# Patient Record
Sex: MALE | Race: OTHER | Employment: UNEMPLOYED | URBAN - METROPOLITAN AREA
[De-identification: names, ages, dates, MRNs, and addresses within clinical notes are randomized per-mention and may not be internally consistent; named-entity substitution may affect disease eponyms.]

---
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
## 2022-09-14 ENCOUNTER — VIRTUAL VISIT (OUTPATIENT)
Dept: FAMILY MEDICINE CLINIC | Age: 22
End: 2022-09-14
Payer: MEDICAID

## 2022-09-14 DIAGNOSIS — Z11.4 SCREENING FOR HIV (HUMAN IMMUNODEFICIENCY VIRUS): ICD-10-CM

## 2022-09-14 DIAGNOSIS — R23.8 ERYTHEMATOUS PAPULES OF SKIN: Primary | ICD-10-CM

## 2022-09-14 DIAGNOSIS — Z11.3 SCREENING FOR STD (SEXUALLY TRANSMITTED DISEASE): ICD-10-CM

## 2022-09-14 DIAGNOSIS — Z11.59 ENCOUNTER FOR HEPATITIS C SCREENING TEST FOR LOW RISK PATIENT: ICD-10-CM

## 2022-09-14 PROCEDURE — 99213 OFFICE O/P EST LOW 20 MIN: CPT | Performed by: STUDENT IN AN ORGANIZED HEALTH CARE EDUCATION/TRAINING PROGRAM

## 2022-09-14 NOTE — ASSESSMENT & PLAN NOTE
Chronic. Present for 3 months.  Ddx is broad includes STIs  - STI testing   - Scheduled in person visit for physical exam and follow up STI results

## 2022-09-14 NOTE — PROGRESS NOTES
Bria   23 y.o. male  2000  [address] [place]  [id number]   [address]:    Telemedicine Progress Note  Janice Molina MD       Encounter Date and Time: September 16, 2022 at 11:17 AM    Consent:  He and/or the health care decision maker is aware that that he may receive a bill for this telephone service, depending on his insurance coverage, and has provided verbal consent to proceed: Yes    Chief Complaint   Patient presents with    Skin Problem       History of Present Illness   Adolm Home STEVEN Ornelas is a 23 y.o. male was evaluated by synchronous (real-time) audio-video technology from home, through the [facility] Portal.    Skin rash  - Started 3 mo ago  - Not improving or worsening. Staying same  - Currently present on arms, genitals, back, stomach. No penile lesions. No lesions on hands  - Itchy  - Red bumps  - No h/o skin issues  - No fever, fatigue or malaise  - No contacts with skin issues      Review of Systems   Review of Systems   Constitutional:  Negative for fever and malaise/fatigue. Musculoskeletal:  Negative for joint pain. Skin:  Positive for itching and rash. Vitals/Objective:     General: alert, cooperative, no distress   Mental  status: mental status: alert, oriented to person, place, and time, normal mood, behavior, speech, dress, motor activity, and thought processes   Resp: resp: normal effort and no respiratory distress   Neuro: neuro: no gross deficits   Skin: skin: +erythematous papule noted on trunk   Due to this being a TeleHealth evaluation, many elements of the physical examination are unable to be assessed. Assessment and Plan:   Diagnoses and all orders for this visit:    1. Erythematous papules of skin  Assessment & Plan:  Chronic. Present for 3 months. Ddx is broad includes STIs  - STI testing   - Scheduled in person visit for physical exam and follow up STI results        2.  Screening for HIV (human immunodeficiency virus)  -     HIV 1/2 AG/AB, 4TH GENERATION,W RFLX CONFIRM; Future    3. Encounter for hepatitis C screening test for low risk patient  -     HEPATITIS C AB; Future    4. Screening for STD (sexually transmitted disease)  Assessment & Plan:  Screen for STIs given rash    Orders:  -     Johann Martinez / Agustín Chand; Future  -     RPR; Future        Time spent in direct conversation with the patient to include medical condition(s) discussed, assessment and treatment plan:       We discussed the expected course, resolution and complications of the diagnosis(es) in detail. Medication risks, benefits, costs, interactions, and alternatives were discussed as indicated. I advised him to contact the office if his condition worsens, changes or fails to improve as anticipated. He expressed understanding with the diagnosis(es) and plan. Patient understands that this encounter was a temporary measure, and the importance of further follow up and examination was emphasized. Patient verbalized understanding. Patient informed to follow up: Tue Sept 20th at [facility] for lab work then Sept 21st 6pm for physical exam.    Electronically Signed: Serg Sood MD    Providers location when delivering service (clinic, hospital, home): home    CPT Codes 36646-96519 for Established Patients may apply to this Telehealth Visit. POS code: 18. Modifier GT      Pursuant to the emergency declaration under the [facility] waiver authority and the [employer] Act, this Virtual  Visit was conducted, with patient's consent, to reduce the patient's risk of exposure to COVID-19 and provide continuity of care for an established patient. Services were provided through a video synchronous discussion virtually to substitute for in-person clinic visit.    History   Patients past medical, surgical and family histories were reviewed and updated. Past Medical History:   Diagnosis Date    PPD screening test neg  11.22.2015     No past surgical history on file. No family history on file. Social History     Socioeconomic History    Marital status: SINGLE     Spouse name: Not on file    Number of children: Not on file    Years of education: Not on file    Highest education level: Not on file   Occupational History    Not on file   Tobacco Use    Smoking status: Never    Smokeless tobacco: Never   Substance and Sexual Activity    Alcohol use: No    Drug use: No    Sexual activity: Never   Other Topics Concern    Not on file   Social History Narrative    Not on file     Social Determinants of Health     Financial Resource Strain: Not on file   Food Insecurity: Not on file   Transportation Needs: Not on file   Physical Activity: Not on file   Stress: Not on file   Social Connections: Not on file   Intimate Partner Violence: Not on file   Housing Stability: Not on file     Patient Active Problem List   Diagnosis Code    Erythematous papules of skin R23.8    Screening for STD (sexually transmitted disease) Z11.3            Current Medications/Allergies   Medications and Allergies reviewed:    Current Outpatient Medications   Medication Sig Dispense Refill    calcium carbonate (TUMS) 200 mg calcium (500 mg) chew Take 1 Tab by mouth daily.  30 Tab 0     No Known Allergies

## 2022-09-16 PROBLEM — Z11.3 SCREENING FOR STD (SEXUALLY TRANSMITTED DISEASE): Status: ACTIVE | Noted: 2022-09-16

## 2022-09-20 ENCOUNTER — LAB ONLY (OUTPATIENT)
Dept: FAMILY MEDICINE CLINIC | Age: 22
End: 2022-09-20

## 2022-09-20 DIAGNOSIS — Z11.59 ENCOUNTER FOR HEPATITIS C SCREENING TEST FOR LOW RISK PATIENT: ICD-10-CM

## 2022-09-20 DIAGNOSIS — Z11.3 SCREENING FOR STD (SEXUALLY TRANSMITTED DISEASE): ICD-10-CM

## 2022-09-20 DIAGNOSIS — Z11.4 SCREENING FOR HIV (HUMAN IMMUNODEFICIENCY VIRUS): ICD-10-CM

## 2022-09-21 ENCOUNTER — OFFICE VISIT (OUTPATIENT)
Dept: FAMILY MEDICINE CLINIC | Age: 22
End: 2022-09-21
Payer: MEDICAID

## 2022-09-21 VITALS
TEMPERATURE: 98.4 F | OXYGEN SATURATION: 98 % | RESPIRATION RATE: 16 BRPM | BODY MASS INDEX: 22.54 KG/M2 | WEIGHT: 143.6 LBS | HEART RATE: 60 BPM | SYSTOLIC BLOOD PRESSURE: 110 MMHG | HEIGHT: 67 IN | DIASTOLIC BLOOD PRESSURE: 63 MMHG

## 2022-09-21 DIAGNOSIS — R74.8 ELEVATED ALKALINE PHOSPHATASE LEVEL: ICD-10-CM

## 2022-09-21 DIAGNOSIS — R23.8 ERYTHEMATOUS PAPULES OF SKIN: Primary | ICD-10-CM

## 2022-09-21 LAB
HCV AB SERPL QL IA: NONREACTIVE
HIV 1+2 AB+HIV1 P24 AG SERPL QL IA: NONREACTIVE
HIV12 RESULT COMMENT, HHIVC: NORMAL
RPR SER QL: NONREACTIVE

## 2022-09-21 PROCEDURE — 99213 OFFICE O/P EST LOW 20 MIN: CPT | Performed by: STUDENT IN AN ORGANIZED HEALTH CARE EDUCATION/TRAINING PROGRAM

## 2022-09-21 NOTE — PROGRESS NOTES
Identified pt with two pt identifiers(name and ). Reviewed record in preparation for visit and have obtained necessary documentation. Chief Complaint   Patient presents with    Rash     Private parts, abdominal, patient states it itches, x 3 months        Health Maintenance Due   Topic    Depression Screen     COVID-19 Vaccine (1)    Flu Vaccine (1)       Visit Vitals  /63 (BP 1 Location: Right upper arm, BP Patient Position: Sitting, BP Cuff Size: Adult)   Pulse 60   Temp 98.4 °F (36.9 °C) (Oral)   Resp 16   Ht 5' 7\" (1.702 m)   Wt 143 lb 9.6 oz (65.1 kg)   SpO2 98%   BMI 22.49 kg/m²         Coordination of Care Questionnaire:  :   1) Have you been to an emergency room, urgent care, or hospitalized since your last visit? If yes, where when, and reason for visit? no       2. Have seen or consulted any other health care provider since your last visit? If yes, where when, and reason for visit? NO      Patient is accompanied by self I have received verbal consent from Mona Franks to discuss any/all medical information while they are present in the room.

## 2022-09-21 NOTE — PROGRESS NOTES
[address] [facility] [address] [place] [address]   Office [phone]  Fax [phone]     9/22/2022   Chief Complaint   Patient presents with    Rash     Private parts, abdominal, patient states it itches, x 3 months       HPI:  Keri Sweeney is a 23 y.o. male who presents to clinic today for rash    Rash  - Started 3 mo ago  - Described as itchy bumps. Few lesions. Resolve spontaneously and leave darkened skin behind  - On penis and scrotum, rarely on bottom of abdomen  - Shaves scrotum  - Uncircumcised  - No penile pain, discharge  - One female sexual partner for last 3 years    Reviewed lab test from patient's first on 7/25/22  -      Patients past medical, surgical and family histories were reviewed. Allergies and Medications reviewed and updated. Review of Systems   Constitutional:  Negative for diaphoresis, fever and weight loss. Skin:  Positive for itching and rash. Objective:  Vitals:    09/21/22 1814   BP: 110/63   Pulse: 60   Resp: 16   Temp: 98.4 °F (36.9 °C)   TempSrc: Oral   SpO2: 98%   Weight: 143 lb 9.6 oz (65.1 kg)   Height: 5' 7\" (1.702 m)     Body mass index is 22.49 kg/m². Physical Exam  General: Patient alert and oriented and in NAD  HEENT: PER/EOMI, no conjunctival pallor or scleral icterus. Heart: Regular rate and rhythm, No murmurs, rubs or gallops. 2+ peripheral pulses  Lungs: Clear to auscultation bilaterally, no wheezing, rales or rhonchi  Abd: +BS, non-tender, non-distended. Lesion previously seen on telemed on abd has resolved. Ext: No edema  Skin: No rashes or lesions noted on exposed skin,  Psych: Appropriate mood and affect  Genital: 2 skin colored nontender nodules on shaft or penis. 2 red colored papules on shaved scrotum. No ulceration, discharge or Lymphadenopathy. No testicular swelling or pain. Exam supervised by nursing    No results found for this or any previous visit (from the past 24 hour(s)).     Assessment and Plan:  Diagnoses and all orders for this visit:    1. Erythematous papules of skin  Assessment & Plan:  Nontender. Ddx includes molluscum, vs ingrown hair from shaving. HIV test neg. - Stop shaving  - F/u GC/CT test  - Follow up in 4 weeks or sooner if not improving      2. Elevated alkaline phosphatase level  Assessment & Plan:  Noted on blood work at patient's first  - Recheck fasting    Orders:  -     METABOLIC PANEL, COMPREHENSIVE; Future      Follow-up and Dispositions    Return in about 1 week (around 9/28/2022) for Lab test.         I have discussed the aforementioned diagnoses and plan with the patient in detail. I have provided information in person and/or in AVS. All questions answered prior to discharge.      I discussed this patient with Dr. Jose Cruz (Attending Physician)   Signed By:  Bal Lara MD     Family Medicine Resident

## 2022-09-22 PROBLEM — R74.8 ELEVATED ALKALINE PHOSPHATASE LEVEL: Status: ACTIVE | Noted: 2022-09-22

## 2022-09-22 LAB
C TRACH RRNA SPEC QL NAA+PROBE: NEGATIVE
N GONORRHOEA RRNA SPEC QL NAA+PROBE: NEGATIVE
SPECIMEN SOURCE: NORMAL

## 2022-09-22 NOTE — ASSESSMENT & PLAN NOTE
Nontender. Ddx includes molluscum, vs ingrown hair from shaving. HIV test neg.    - Stop shaving  - F/u GC/CT test  - Follow up in 4 weeks or sooner if not improving

## 2022-09-28 ENCOUNTER — LAB ONLY (OUTPATIENT)
Dept: FAMILY MEDICINE CLINIC | Age: 22
End: 2022-09-28

## 2022-09-28 DIAGNOSIS — R74.8 ELEVATED ALKALINE PHOSPHATASE LEVEL: ICD-10-CM

## 2022-09-29 DIAGNOSIS — R74.8 ELEVATED ALKALINE PHOSPHATASE LEVEL: Primary | ICD-10-CM

## 2022-09-29 LAB
ALBUMIN SERPL-MCNC: 4.6 G/DL (ref 3.5–5)
ALBUMIN/GLOB SERPL: 1.4 {RATIO} (ref 1.1–2.2)
ALP SERPL-CCNC: 151 U/L (ref 45–117)
ALT SERPL-CCNC: 24 U/L (ref 12–78)
ANION GAP SERPL CALC-SCNC: 6 MMOL/L (ref 5–15)
AST SERPL-CCNC: 22 U/L (ref 15–37)
BILIRUB SERPL-MCNC: 0.8 MG/DL (ref 0.2–1)
BUN SERPL-MCNC: 15 MG/DL (ref 6–20)
BUN/CREAT SERPL: 16 (ref 12–20)
CALCIUM SERPL-MCNC: 9.1 MG/DL (ref 8.5–10.1)
CHLORIDE SERPL-SCNC: 108 MMOL/L (ref 97–108)
CO2 SERPL-SCNC: 26 MMOL/L (ref 21–32)
CREAT SERPL-MCNC: 0.93 MG/DL (ref 0.7–1.3)
GLOBULIN SER CALC-MCNC: 3.2 G/DL (ref 2–4)
GLUCOSE SERPL-MCNC: 85 MG/DL (ref 65–100)
POTASSIUM SERPL-SCNC: 4.2 MMOL/L (ref 3.5–5.1)
PROT SERPL-MCNC: 7.8 G/DL (ref 6.4–8.2)
SODIUM SERPL-SCNC: 140 MMOL/L (ref 136–145)

## 2022-09-29 NOTE — PROGRESS NOTES
[address] Medicine Residency Attending Addendum:  Dr. Jacquie Li MD,  the patient and I were not physically present during this encounter. The resident and I are concurrently monitoring the patient care through appropriate telecommunication technology. I discussed the findings, assessment and plan with the resident and agree with the resident's findings and plan as documented in the resident's note.       Willis Thakkar MD

## 2022-09-30 LAB — GGT SERPL-CCNC: 28 U/L (ref 15–85)

## 2022-10-14 ENCOUNTER — TELEPHONE (OUTPATIENT)
Dept: FAMILY MEDICINE CLINIC | Age: 22
End: 2022-10-14

## 2022-10-14 NOTE — PROGRESS NOTES
GGT wnl, indicating elevated ALP of bony origin.  To be addressed at next visit    Future Appointments  10/20/2022 3:40 PM    Jessica Bhakta MD             [facility]                BS AMB

## 2022-10-14 NOTE — TELEPHONE ENCOUNTER
Called pt about lab results with help of  [facility]. Discussed likely cause of elevated ALP is bony origin given normal GGT. Likely due to pt's age and continued growth / bone turn over. Pt concerned it may be due to another issue, discussed other ddx include Vit D def, thyroid problem, parathyroid problem. Pt will discuss further testing at upcoming visit.      Future Appointments   Date Time Provider Sujatha Anel   10/20/2022  3:40 PM Minor Rey MD [facility] BS AMB         Yanira Greco MD

## 2022-10-16 PROBLEM — Z11.3 SCREENING FOR STD (SEXUALLY TRANSMITTED DISEASE): Status: RESOLVED | Noted: 2022-09-16 | Resolved: 2022-10-16

## 2022-10-20 ENCOUNTER — OFFICE VISIT (OUTPATIENT)
Dept: FAMILY MEDICINE CLINIC | Age: 22
End: 2022-10-20
Payer: MEDICAID

## 2022-10-20 VITALS
OXYGEN SATURATION: 99 % | BODY MASS INDEX: 22.63 KG/M2 | HEART RATE: 62 BPM | HEIGHT: 67 IN | TEMPERATURE: 97.9 F | WEIGHT: 144.2 LBS | RESPIRATION RATE: 16 BRPM | DIASTOLIC BLOOD PRESSURE: 70 MMHG | SYSTOLIC BLOOD PRESSURE: 122 MMHG

## 2022-10-20 DIAGNOSIS — R74.8 ELEVATED ALKALINE PHOSPHATASE LEVEL: Primary | ICD-10-CM

## 2022-10-20 PROCEDURE — 99214 OFFICE O/P EST MOD 30 MIN: CPT | Performed by: STUDENT IN AN ORGANIZED HEALTH CARE EDUCATION/TRAINING PROGRAM

## 2022-10-20 NOTE — PROGRESS NOTES
Identified pt with two pt identifiers(name and ). Reviewed record in preparation for visit and have obtained necessary documentation. Chief Complaint   Patient presents with    Physical     Patient states he is no longer itching and feels things are getting better        Health Maintenance Due   Topic    COVID-19 Vaccine (3 - Booster for Pfizer series)    Flu Vaccine (1)       Visit Vitals  /70 (BP 1 Location: Left upper arm, BP Patient Position: Sitting, BP Cuff Size: Small adult)   Pulse 62   Temp 97.9 °F (36.6 °C) (Oral)   Resp 16   Ht 5' 7\" (1.702 m)   Wt 144 lb 3.2 oz (65.4 kg)   SpO2 99%   BMI 22.58 kg/m²         Coordination of Care Questionnaire:  :   1) Have you been to an emergency room, urgent care, or hospitalized since your last visit? If yes, where when, and reason for visit? no       2. Have seen or consulted any other health care provider since your last visit? If yes, where when, and reason for visit? NO        Patient is accompanied by self I have received verbal consent from Kate Santiago to discuss any/all medical information while they are present in the room.

## 2022-10-20 NOTE — PROGRESS NOTES
Subjective   Kurt Perez is a 23 y.o. male who presents for Physical (Patient states he is no longer itching and feels things are getting better)    Elevated Alk Phos  Elevated on routine CMP. GGT was normal, indicating this is of bony origin. Endorses shoulder pain when he works hard in [employer]. Denies other pains, bony pain, fevers, chills. Still has some itching generally, but improving and seeing a dermatologist.    Review of Systems   Review of Systems   Constitutional:  Negative for chills and fever. Musculoskeletal:  Negative for arthralgias, back pain, gait problem, myalgias and neck pain. Skin:  Negative for rash and wound. Medical History  Past Medical History:   Diagnosis Date    PPD screening test neg  11.22.2015       Medications  Current Outpatient Medications   Medication Sig    calcium carbonate (TUMS) 200 mg calcium (500 mg) chew Take 1 Tab by mouth daily. (Patient not taking: No sig reported)     No current facility-administered medications for this visit.        Immunizations   Immunization History   Administered Date(s) Administered    BCG Vaccine 2000    COVID-19, PFIZER PURPLE top, DILUTE for use, (age 15 y+), IM, 30mcg/0.3mL 09/30/2021, 10/21/2021    DTaP 2000, 2000, 2000    HPV 11/20/2015, 09/26/2017    HPV (Quad) 09/26/2017    Hep A Vaccine 11/20/2015, 10/31/2017    Hep A Vaccine 2 Dose Schedule (Ped/Adol) 10/31/2017    Hep B Vaccine 2000, 2000, 2000, 11/20/2015, 09/26/2017    Hep B, Adol/Ped 09/26/2017    Hib 2000, 2000, 2002    IPV 2000, 2002, 2002, 05/17/2006, 09/26/2017    Influenza Vaccine 11/20/2015, 10/31/2017    Influenza, FLUARIX, FLULAVAL, Colan Sun (age 10 mo+) AND AFLURIA, (age 1 y+), PF, 0.5mL 10/31/2017, 12/19/2019    MMR 11/20/2015, 09/26/2017    Meningococcal (MCV4O) Vaccine 11/20/2015, 12/19/2019    Meningococcal (MCV4P) Vaccine 11/20/2015    Poliovirus vaccine 2000, 2002, 2002, 12/01/2003, 05/17/2006, 11/20/2015, 09/26/2017    Td 09/26/2017    Td, Adsorbed 09/26/2017    Tdap 11/20/2015    Varicella Virus Vaccine 11/20/2015, 09/26/2017       Allergies   No Known Allergies    Objective   Vital Signs  Visit Vitals  /70 (BP 1 Location: Left upper arm, BP Patient Position: Sitting, BP Cuff Size: Small adult)   Pulse 62   Temp 97.9 °F (36.6 °C) (Oral)   Resp 16   Ht 5' 7\" (1.702 m)   Wt 144 lb 3.2 oz (65.4 kg)   SpO2 99%   BMI 22.58 kg/m²       Physical Examination  Physical Exam  Vitals and nursing note reviewed. Constitutional:       General: He is not in acute distress. Appearance: Normal appearance. HENT:      Head: Normocephalic and atraumatic. Right Ear: External ear normal.      Left Ear: External ear normal.      Nose: Nose normal.      Mouth/Throat:      Mouth: Mucous membranes are moist.      Pharynx: Oropharynx is clear. Eyes:      General: No scleral icterus. Musculoskeletal:         General: No swelling, tenderness, deformity or signs of injury. Normal range of motion. Cervical back: Normal range of motion and neck supple. Neurological:      General: No focal deficit present. Mental Status: He is alert. Assessment and Plan   Kurt Murphy is a 23 y.o. male who presents for Physical (Patient states he is no longer itching and feels things are getting better)    1. Elevated alkaline phosphatase level  Elevation in setting of normal GGT suggests bony involvement. Concern for bone turnover, which would occur with sarcomas, thyroid, parathyroid problems. They have been isolated. Will do further labwork today to evaluate. May need referral to endocrinology, with repeat CMP prior to ensure persistent elevation.  - CALCIUM; Future  - TSH 3RD GENERATION; Future  - PTH INTACT; Future  - VITAMIN D, 25 HYDROXY; Future     Return in about 4 weeks (around 11/17/2022), or if symptoms worsen or fail to improve.     Pt was discussed with Dr. Romario Dunn (attending physician). I have reviewed patient medical and social history and medications. I have reviewed pertinent labs results and other data. I have discussed the diagnosis with the patient and the intended plan as seen in the above orders. The patient has received an after-visit summary and questions were answered concerning future plans. I have discussed medication side effects and warnings with the patient as well.     Amira Mata MD  Resident, [facility]  10/20/22

## 2022-10-21 ENCOUNTER — TELEPHONE (OUTPATIENT)
Dept: FAMILY MEDICINE CLINIC | Age: 22
End: 2022-10-21

## 2022-10-21 DIAGNOSIS — E55.9 VITAMIN D DEFICIENCY: Primary | ICD-10-CM

## 2022-10-21 LAB
25(OH)D3 SERPL-MCNC: 24.8 NG/ML (ref 30–100)
CALCIUM SERPL-MCNC: 9.1 MG/DL (ref 8.5–10.1)
CALCIUM SERPL-MCNC: 9.3 MG/DL (ref 8.5–10.1)
PTH-INTACT SERPL-MCNC: 42.7 PG/ML (ref 18.4–88)
TSH SERPL DL<=0.05 MIU/L-ACNC: 1.31 UIU/ML (ref 0.36–3.74)

## 2022-10-21 RX ORDER — CYANOCOBALAMIN (VITAMIN B-12) 500 MCG
800 TABLET ORAL DAILY
Qty: 180 TABLET | Refills: 1 | Status: SHIPPED | OUTPATIENT
Start: 2022-10-21

## 2022-10-21 NOTE — TELEPHONE ENCOUNTER
[address] [facility] to discuss lab results. Reached patient. Identified by name and . Normal thyroid, parathyroid, calcium levels. Discussed low vitamin D. We will start vitamin D supplementation, cholecalciferol 800 units daily. We will repeat vitamin D level and CMP in 3 months. All questions answered. Due to language barrier, an  was used with this patient - [employer]  #[id number].

## 2022-10-21 NOTE — PROGRESS NOTES
TSH, parathyroid, calcium wnl. Vit D low, will supplement. See phone encounter for remainder of plan.

## 2023-05-21 RX ORDER — UREA 10 %
200 LOTION (ML) TOPICAL DAILY
COMMUNITY
Start: 2019-01-11

## 2023-05-21 RX ORDER — OMEGA-3S/DHA/EPA/FISH OIL/D3 300MG-1000
800 CAPSULE ORAL DAILY
COMMUNITY
Start: 2022-10-21

## 2023-11-29 ENCOUNTER — TRANSCRIBE ORDERS (OUTPATIENT)
Facility: HOSPITAL | Age: 23
End: 2023-11-29

## 2023-11-29 ENCOUNTER — HOSPITAL ENCOUNTER (OUTPATIENT)
Facility: HOSPITAL | Age: 23
Discharge: HOME | End: 2023-12-02
Payer: MEDICAID

## 2023-11-29 DIAGNOSIS — M25.511 RIGHT SHOULDER PAIN, UNSPECIFIED CHRONICITY: ICD-10-CM

## 2023-11-29 DIAGNOSIS — M25.511 RIGHT SHOULDER PAIN, UNSPECIFIED CHRONICITY: Primary | ICD-10-CM

## 2023-11-29 PROCEDURE — 73030 X-RAY EXAM OF SHOULDER: CPT

## 2024-12-03 ENCOUNTER — OFFICE VISIT (OUTPATIENT)
Age: 24
End: 2024-12-03
Payer: MEDICAID

## 2024-12-03 VITALS
OXYGEN SATURATION: 96 % | HEIGHT: 67 IN | SYSTOLIC BLOOD PRESSURE: 138 MMHG | HEART RATE: 71 BPM | BODY MASS INDEX: 23.39 KG/M2 | DIASTOLIC BLOOD PRESSURE: 80 MMHG | TEMPERATURE: 98.7 F | WEIGHT: 149 LBS | RESPIRATION RATE: 18 BRPM

## 2024-12-03 DIAGNOSIS — M25.511 CHRONIC RIGHT SHOULDER PAIN: ICD-10-CM

## 2024-12-03 DIAGNOSIS — Z00.00 ENCOUNTER FOR WELL ADULT EXAM WITHOUT ABNORMAL FINDINGS: Primary | ICD-10-CM

## 2024-12-03 DIAGNOSIS — R74.8 ELEVATED ALKALINE PHOSPHATASE LEVEL: ICD-10-CM

## 2024-12-03 DIAGNOSIS — G89.29 CHRONIC RIGHT SHOULDER PAIN: ICD-10-CM

## 2024-12-03 PROCEDURE — 99395 PREV VISIT EST AGE 18-39: CPT

## 2024-12-03 PROCEDURE — 99213 OFFICE O/P EST LOW 20 MIN: CPT

## 2024-12-03 SDOH — ECONOMIC STABILITY: FOOD INSECURITY: WITHIN THE PAST 12 MONTHS, THE FOOD YOU BOUGHT JUST DIDN'T LAST AND YOU DIDN'T HAVE MONEY TO GET MORE.: NEVER TRUE

## 2024-12-03 SDOH — ECONOMIC STABILITY: FOOD INSECURITY: WITHIN THE PAST 12 MONTHS, YOU WORRIED THAT YOUR FOOD WOULD RUN OUT BEFORE YOU GOT MONEY TO BUY MORE.: NEVER TRUE

## 2024-12-03 SDOH — ECONOMIC STABILITY: INCOME INSECURITY: HOW HARD IS IT FOR YOU TO PAY FOR THE VERY BASICS LIKE FOOD, HOUSING, MEDICAL CARE, AND HEATING?: NOT VERY HARD

## 2024-12-03 ASSESSMENT — PATIENT HEALTH QUESTIONNAIRE - PHQ9
2. FEELING DOWN, DEPRESSED OR HOPELESS: NOT AT ALL
1. LITTLE INTEREST OR PLEASURE IN DOING THINGS: NOT AT ALL
SUM OF ALL RESPONSES TO PHQ QUESTIONS 1-9: 0
SUM OF ALL RESPONSES TO PHQ9 QUESTIONS 1 & 2: 0

## 2024-12-04 LAB
ALBUMIN SERPL-MCNC: 4.2 G/DL (ref 3.5–5)
ALBUMIN/GLOB SERPL: 1.2 (ref 1.1–2.2)
ALP SERPL-CCNC: 116 U/L (ref 45–117)
ALT SERPL-CCNC: 27 U/L (ref 12–78)
AST SERPL-CCNC: 25 U/L (ref 15–37)
BILIRUB DIRECT SERPL-MCNC: 0.2 MG/DL (ref 0–0.2)
BILIRUB SERPL-MCNC: 0.7 MG/DL (ref 0.2–1)
GLOBULIN SER CALC-MCNC: 3.5 G/DL (ref 2–4)
PROT SERPL-MCNC: 7.7 G/DL (ref 6.4–8.2)

## 2024-12-04 ASSESSMENT — ENCOUNTER SYMPTOMS
ABDOMINAL PAIN: 0
COUGH: 0
VOMITING: 0
SHORTNESS OF BREATH: 0
NAUSEA: 0

## 2024-12-06 NOTE — PROGRESS NOTES
# [id number], Martir    Patient has been identified by name and .    Chief Complaint   Patient presents with    Annual Exam     Pt reports for annual exam       Vitals:    24 1401   BP: 138/80   Site: Right Upper Arm   Position: Sitting   Cuff Size: Medium Adult   Pulse: 71   Resp: 18   Temp: 98.7 °F (37.1 °C)   TempSrc: Oral   SpO2: 96%   Weight: 67.6 kg (149 lb)   Height: 1.702 m (5' 7\")        \"Have you been to the ER, urgent care clinic since your last visit?  Hospitalized since your last visit?\"    NO    “Have you seen or consulted any other health care providers outside of [facility] since your last visit?”    NO              
I discussed the findings, assessment and plan with the resident and agree with the resident's findings and plan as documented in the resident's note.      
09/26/2017    Varicella, VARIVAX, (age 12m+), SC, 0.5mL 11/20/2015, 09/26/2017        Health Maintenance Due   Topic Date Due    Flu vaccine (1) 08/01/2024    COVID-19 Vaccine (3 - 2023-24 season) 09/01/2024      Recommendations for Preventive Services Due: see orders and patient instructions/AVS.    Patient discussed with attending Dr. Cee

## 2024-12-10 ENCOUNTER — HOSPITAL ENCOUNTER (OUTPATIENT)
Facility: HOSPITAL | Age: 24
Setting detail: RECURRING SERIES
Discharge: HOME | End: 2024-12-13
Payer: MEDICAID

## 2024-12-10 PROCEDURE — G0283 ELEC STIM OTHER THAN WOUND: HCPCS | Performed by: PHYSICAL THERAPIST

## 2024-12-10 PROCEDURE — 97110 THERAPEUTIC EXERCISES: CPT | Performed by: PHYSICAL THERAPIST

## 2024-12-10 PROCEDURE — 97162 PT EVAL MOD COMPLEX 30 MIN: CPT | Performed by: PHYSICAL THERAPIST

## 2024-12-10 NOTE — THERAPY EVALUATION
Physical Therapy at [place],   a part of [address], [address]  [place] [address]  Phone: [phone]  Fax: [phone]       PHYSICAL THERAPY - EVALUATION/PLAN OF CARE NOTE (updated 3/23)      Date: 12/10/2024          Patient Name:  Je Leal :  2000   Medical   Diagnosis:  Chronic right shoulder pain [M25.511, G89.29] Treatment Diagnosis:  M25.511  RIGHT SHOULDER PAIN    Referral Source:  Yomi Ambrosio MD Provider #:  [id number]                Insurance: Payor: [facility] / Plan: [employer] [place] / Product Type: *No Product type* /      Patient  verified yes     Visit #   Current  / Total 1 24   Time   In / Out 10:15 AM 11:15 AM   Total Treatment Time 60   Total Timed Codes 25         SUBJECTIVE  Pain Level (0-10 scale): 3/10  []constant [x]intermittent []improving []worsening []no change since onset    Any medication changes, allergies to medications, adverse drug reactions, diagnosis change, or new procedure performed?: [x] No    [] Yes (see summary sheet for update)  Medications: Verified on Patient Summary List    Subjective functional status/changes:     R shoulder/UT pain    Start of Care: 12/10/2024  Onset Date: 3 years ago  Current symptoms/Complaints: Pain along the R UT  Mechanism of Injury: Repetitive overhead lifting at work  PLOF: No regular exercise program, very active with work  Limitations to PLOF/Activity or Recreational Limitations: He is able to complete all work tasks, but notices the shoulder fatigues sooner than he would like and he is in a lot of pain after the work day.  Work Hx: Drywall construction  Mobility: No limitations with walking, transfers  Self Care: Some limitations with tasks that involve reaching overhead, such as drying hair  Previous Treatment/Compliance: He was evaluated by his PCP and referred to PT. X-rays were

## 2024-12-11 ENCOUNTER — TELEPHONE (OUTPATIENT)
Age: 24
End: 2024-12-11

## 2024-12-19 ENCOUNTER — HOSPITAL ENCOUNTER (OUTPATIENT)
Facility: HOSPITAL | Age: 24
Setting detail: RECURRING SERIES
Discharge: HOME | End: 2024-12-22
Payer: MEDICAID

## 2024-12-19 PROCEDURE — 97110 THERAPEUTIC EXERCISES: CPT

## 2024-12-19 PROCEDURE — 97140 MANUAL THERAPY 1/> REGIONS: CPT

## 2024-12-19 NOTE — PROGRESS NOTES
PHYSICAL THERAPY - MEDICARE DAILY TREATMENT NOTE (updated 3/23)      Date: 2024          Patient Name:  Je Leal :  2000   Medical   Diagnosis:  Chronic right shoulder pain [M25.511, G89.29] Treatment Diagnosis:  M25.511  RIGHT SHOULDER PAIN    Referral Source:  Yomi Ambrosio MD Insurance:   Payor: [facility] / Plan: [employer] [place] / Product Type: *No Product type* /                     Patient  verified YES    Visit #   Current  / Total 2 24   Time   In / Out [phone]   Total Treatment Time 60   Total Timed Codes 45   1:1 Treatment Time 45      [facility] Totals Reminder:  bill using total billable   min of TIMED therapeutic procedures and modalities.   8-22 min = 1 unit; 23-37 min = 2 units; 38-52 min = 3 units; 53-67 min = 4 units; 68-82 min = 5 units        2TE, 1MT    SUBJECTIVE    Pain Level (0-10 scale): 3/10    Any medication changes, allergies to medications, adverse drug reactions, diagnosis change, or new procedure performed?: [x] No    [] Yes (see summary sheet for update)  Medications: Verified on Patient Summary List    Subjective functional status/changes:     Pt stated that their pain level was a 3/10.  They pointed to their posterior glenohumeral joint as the location of their pain.  Pt stated Lat pull downs and wall push ups aggravated their pain.  Pt stated VC helped alleviate pain.    OBJECTIVE      Therapeutic Procedures:  Tx Min Billable or 1:1 Min (if diff from Tx Min) Procedure, Rationale, Specifics   [id number] Therapeutic Exercise (timed):  increase ROM, strength, coordination, balance, and proprioception to improve patient's ability to progress to PLOF and address remaining functional goals. (see flow sheet as applicable)     Details if applicable:     10  46463 Manual Therapy (timed):  decrease pain to improve patient's ability to progress to PLOF and address remaining functional goals.  The manual therapy

## 2024-12-30 ENCOUNTER — HOSPITAL ENCOUNTER (OUTPATIENT)
Facility: HOSPITAL | Age: 24
Setting detail: RECURRING SERIES
Discharge: HOME | End: 2025-01-02
Payer: MEDICAID

## 2024-12-30 PROCEDURE — 97110 THERAPEUTIC EXERCISES: CPT | Performed by: PHYSICAL THERAPIST

## 2024-12-30 PROCEDURE — 97016 VASOPNEUMATIC DEVICE THERAPY: CPT | Performed by: PHYSICAL THERAPIST

## 2024-12-30 NOTE — PROGRESS NOTES
applicable)     Details if applicable:  STM to R Levator Scap and Right Posterior Shoulder   45     Total Total         Modalities Rationale:     decrease inflammation and decrease pain to improve patient's ability to progress to PLOF and address remaining functional goals.       min [] Estim Unattended,             type/location:       []  w/ice    []  w/heat        min [] Estim Attended,             type/location:       []  w/ice   []  w/heat         []  w/US   []  TENS insruct            min []  Mechanical Traction,        type/lbs:        []  pro      []  sup           []  int       []  cont            []  before manual           []  after manual     min []  Ultrasound,         settings/location:      min  unbilled []  Ice     []  Heat            location/position:    15     min [x]  Vasopneumatic Device,      press/temp:   pre-treatment girth :    post-treatment girth :    measured at (landmark       location) :   If using vaso (only need to measure limb vaso being performed on)        min []  Other:      Skin assessment post-treatment (if applicable):    [x]  intact    []  redness- no adverse reaction                 []redness - adverse reaction:          [x]  Patient Education billed concurrently with other procedures   [x] Review HEP    [] Progressed/Changed HEP, detail:    [] Other detail:         Other Objective/Functional Measures  Pain Level at end of session (0-10 scale): 2      Assessment     Patient will continue to benefit from skilled PT / OT services to modify and progress therapeutic interventions, analyze and address functional mobility deficits, analyze and address ROM deficits, analyze and address strength deficits, and analyze and address soft tissue restrictions to address functional deficits and attain remaining goals.    Progress toward goals / Updated goals:  []  See Progress Note/Recertification  Patient tolerated the progression of today's there ex well without a significant increase in

## 2025-01-06 ENCOUNTER — APPOINTMENT (OUTPATIENT)
Facility: HOSPITAL | Age: 25
End: 2025-01-06
Payer: MEDICAID

## 2025-01-13 ENCOUNTER — APPOINTMENT (OUTPATIENT)
Facility: HOSPITAL | Age: 25
End: 2025-01-13
Payer: MEDICAID

## 2025-01-23 ENCOUNTER — HOSPITAL ENCOUNTER (OUTPATIENT)
Facility: HOSPITAL | Age: 25
Setting detail: RECURRING SERIES
Discharge: HOME | End: 2025-01-26
Payer: MEDICAID

## 2025-01-23 PROCEDURE — 97016 VASOPNEUMATIC DEVICE THERAPY: CPT | Performed by: PHYSICAL THERAPIST

## 2025-01-23 PROCEDURE — 97110 THERAPEUTIC EXERCISES: CPT | Performed by: PHYSICAL THERAPIST

## 2025-01-23 NOTE — PROGRESS NOTES
PHYSICAL THERAPY - MEDICARE DAILY TREATMENT NOTE (updated 3/23)      Date: 2025          Patient Name:  Je Leal :  2000   Medical   Diagnosis:  Chronic right shoulder pain [M25.511, G89.29] Treatment Diagnosis:  M25.511  RIGHT SHOULDER PAIN    Referral Source:  Yomi Ambrosio MD Insurance:   Payor: [facility] / Plan: [employer] MED[employer]ID [employer] [place] / Product Type: *No Product type* /                     Patient  verified YES    Visit #   Current  / Total 4 24   Time   In / Out 5:00 PM 6:00 PM   Total Treatment Time 60   Total Timed Codes 45   1:1 Treatment Time 45       [facility] Totals Reminder:  bill using total billable   min of TIMED therapeutic procedures and modalities.   8-22 min = 1 unit; 23-37 min = 2 units; 38-52 min = 3 units; 53-67 min = 4 units; 68-82 min = 5 units      \    SUBJECTIVE    Pain Level (0-10 scale): 3/10    Any medication changes, allergies to medications, adverse drug reactions, diagnosis change, or new procedure performed?: [x] No    [] Yes (see summary sheet for update)  Medications: Verified on Patient Summary List    Subjective functional status/changes:     Patient continues to have difficulty with work tasks, but has found the strengthening exercise to allow for to work for longer with less pain.    OBJECTIVE      Therapeutic Procedures:  Tx Min Billable or 1:1 Min (if diff from Tx Min) Procedure, Rationale, Specifics   45  86260 Therapeutic Exercise (timed):  increase ROM, strength, coordination, balance, and proprioception to improve patient's ability to progress to PLOF and address remaining functional goals. (see flow sheet as applicable)     Details if applicable:       28239 Manual Therapy (timed):  decrease pain to improve patient's ability to progress to PLOF and address remaining functional goals.  The manual therapy interventions were performed at a separate and distinct time from the therapeutic activities

## 2025-02-06 ENCOUNTER — HOSPITAL ENCOUNTER (OUTPATIENT)
Facility: HOSPITAL | Age: 25
Setting detail: RECURRING SERIES
Discharge: HOME | End: 2025-02-09
Payer: MEDICAID

## 2025-02-06 PROCEDURE — 97016 VASOPNEUMATIC DEVICE THERAPY: CPT | Performed by: PHYSICAL THERAPIST

## 2025-02-06 PROCEDURE — 97110 THERAPEUTIC EXERCISES: CPT | Performed by: PHYSICAL THERAPIST

## 2025-02-06 NOTE — PROGRESS NOTES
PHYSICAL THERAPY - MEDICARE DAILY TREATMENT NOTE (updated 3/23)      Date: 2025          Patient Name:  Je Leal :  2000   Medical   Diagnosis:  Chronic right shoulder pain [M25.511, G89.29] Treatment Diagnosis:  M25.511  RIGHT SHOULDER PAIN    Referral Source:  Yomi Ambrosio MD Insurance:   Payor: [facility] / Plan: [employer] MED[employer]ID [employer] [place] / Product Type: *No Product type* /                     Patient  verified YES    Visit #   Current  / Total 5 24   Time   In / Out 5:00 PM 6:00 PM   Total Treatment Time 60   Total Timed Codes 45   1:1 Treatment Time 45      [facility] Totals Reminder:  bill using total billable   min of TIMED therapeutic procedures and modalities.   8-22 min = 1 unit; 23-37 min = 2 units; 38-52 min = 3 units; 53-67 min = 4 units; 68-82 min = 5 units      \    SUBJECTIVE    Pain Level (0-10 scale): sore    Any medication changes, allergies to medications, adverse drug reactions, diagnosis change, or new procedure performed?: [x] No    [] Yes (see summary sheet for update)  Medications: Verified on Patient Summary List    Subjective functional status/changes:     Patient is feeling much better overall and is very happy with his progress.    OBJECTIVE      Therapeutic Procedures:  Tx Min Billable or 1:1 Min (if diff from Tx Min) Procedure, Rationale, Specifics   45  [id number] Therapeutic Exercise (timed):  increase ROM, strength, coordination, balance, and proprioception to improve patient's ability to progress to PLOF and address remaining functional goals. (see flow sheet as applicable)     Details if applicable:       76970 Manual Therapy (timed):  decrease pain to improve patient's ability to progress to PLOF and address remaining functional goals.  The manual therapy interventions were performed at a separate and distinct time from the therapeutic activities interventions . (see flow sheet as applicable)     Details if applicable:

## 2025-02-13 ENCOUNTER — HOSPITAL ENCOUNTER (OUTPATIENT)
Facility: HOSPITAL | Age: 25
Setting detail: RECURRING SERIES
Discharge: HOME | End: 2025-02-16
Payer: MEDICAID

## 2025-02-13 PROCEDURE — 97016 VASOPNEUMATIC DEVICE THERAPY: CPT | Performed by: PHYSICAL THERAPIST

## 2025-02-13 PROCEDURE — 97110 THERAPEUTIC EXERCISES: CPT | Performed by: PHYSICAL THERAPIST

## 2025-02-13 NOTE — PROGRESS NOTES
plan of care  Re-Cert Due: N/A  [x]  Upgrade activities as tolerated  []  Discharge due to :  []  Other:      Jin Becker, PT       2/13/2025       5:49 PM

## 2025-02-20 ENCOUNTER — HOSPITAL ENCOUNTER (OUTPATIENT)
Facility: HOSPITAL | Age: 25
Setting detail: RECURRING SERIES
Discharge: HOME | End: 2025-02-23
Payer: MEDICAID

## 2025-02-20 PROCEDURE — 97016 VASOPNEUMATIC DEVICE THERAPY: CPT | Performed by: PHYSICAL THERAPIST

## 2025-02-20 PROCEDURE — 97110 THERAPEUTIC EXERCISES: CPT | Performed by: PHYSICAL THERAPIST

## 2025-02-20 NOTE — PROGRESS NOTES
PHYSICAL THERAPY - MEDICARE DAILY TREATMENT NOTE (updated 3/23)      Date: 2025          Patient Name:  Je Leal :  2000   Medical   Diagnosis:  Chronic right shoulder pain [M25.511, G89.29] Treatment Diagnosis:  M25.511  RIGHT SHOULDER PAIN    Referral Source:  Yomi Ambrosio MD Insurance:   Payor: [facility] / Plan: [employer] MED[employer]ID [employer] [place] / Product Type: *No Product type* /                     Patient  verified YES    Visit #   Current  / Total 7 24   Time   In / Out 3:30 PM 4:30 PM   Total Treatment Time 60   Total Timed Codes 45   1:1 Treatment Time 45      [facility] Totals Reminder:  bill using total billable   min of TIMED therapeutic procedures and modalities.   8-22 min = 1 unit; 23-37 min = 2 units; 38-52 min = 3 units; 53-67 min = 4 units; 68-82 min = 5 units      \    SUBJECTIVE    Pain Level (0-10 scale): 6    Any medication changes, allergies to medications, adverse drug reactions, diagnosis change, or new procedure performed?: [x] No    [] Yes (see summary sheet for update)  Medications: Verified on Patient Summary List    Subjective functional status/changes:     Patient had 2 consecutive days of very hard work tasks and appears to have suffered a setback.     OBJECTIVE      Therapeutic Procedures:  Tx Min Billable or 1:1 Min (if diff from Tx Min) Procedure, Rationale, Specifics   45  87189 Therapeutic Exercise (timed):  increase ROM, strength, coordination, balance, and proprioception to improve patient's ability to progress to PLOF and address remaining functional goals. (see flow sheet as applicable)     Details if applicable:       49752 Manual Therapy (timed):  decrease pain to improve patient's ability to progress to PLOF and address remaining functional goals.  The manual therapy interventions were performed at a separate and distinct time from the therapeutic activities interventions . (see flow sheet as applicable)

## 2025-02-27 ENCOUNTER — APPOINTMENT (OUTPATIENT)
Facility: HOSPITAL | Age: 25
End: 2025-02-27
Payer: MEDICAID